# Patient Record
Sex: FEMALE | Race: WHITE | NOT HISPANIC OR LATINO | URBAN - METROPOLITAN AREA
[De-identification: names, ages, dates, MRNs, and addresses within clinical notes are randomized per-mention and may not be internally consistent; named-entity substitution may affect disease eponyms.]

---

## 2020-05-19 ENCOUNTER — IMPORTED ENCOUNTER (OUTPATIENT)
Dept: URBAN - METROPOLITAN AREA CLINIC 9 | Facility: CLINIC | Age: 70
End: 2020-05-19

## 2020-05-29 ENCOUNTER — IMPORTED ENCOUNTER (OUTPATIENT)
Dept: URBAN - METROPOLITAN AREA CLINIC 9 | Facility: CLINIC | Age: 70
End: 2020-05-29

## 2020-06-11 ENCOUNTER — IMPORTED ENCOUNTER (OUTPATIENT)
Dept: URBAN - METROPOLITAN AREA CLINIC 9 | Facility: CLINIC | Age: 70
End: 2020-06-11

## 2020-06-18 ENCOUNTER — IMPORTED ENCOUNTER (OUTPATIENT)
Dept: URBAN - METROPOLITAN AREA CLINIC 9 | Facility: CLINIC | Age: 70
End: 2020-06-18

## 2020-07-22 ENCOUNTER — IMPORTED ENCOUNTER (OUTPATIENT)
Dept: URBAN - METROPOLITAN AREA CLINIC 9 | Facility: CLINIC | Age: 70
End: 2020-07-22

## 2020-09-09 ENCOUNTER — IMPORTED ENCOUNTER (OUTPATIENT)
Dept: URBAN - METROPOLITAN AREA CLINIC 9 | Facility: CLINIC | Age: 70
End: 2020-09-09

## 2020-10-02 ENCOUNTER — IMPORTED ENCOUNTER (OUTPATIENT)
Dept: URBAN - METROPOLITAN AREA CLINIC 9 | Facility: CLINIC | Age: 70
End: 2020-10-02

## 2020-11-10 ENCOUNTER — IMPORTED ENCOUNTER (OUTPATIENT)
Dept: URBAN - METROPOLITAN AREA CLINIC 9 | Facility: CLINIC | Age: 70
End: 2020-11-10

## 2020-12-17 ENCOUNTER — IMPORTED ENCOUNTER (OUTPATIENT)
Dept: URBAN - METROPOLITAN AREA CLINIC 9 | Facility: CLINIC | Age: 70
End: 2020-12-17

## 2021-02-18 ENCOUNTER — IMPORTED ENCOUNTER (OUTPATIENT)
Dept: URBAN - METROPOLITAN AREA CLINIC 9 | Facility: CLINIC | Age: 71
End: 2021-02-18

## 2021-04-01 ENCOUNTER — IMPORTED ENCOUNTER (OUTPATIENT)
Dept: URBAN - METROPOLITAN AREA CLINIC 9 | Facility: CLINIC | Age: 71
End: 2021-04-01

## 2021-07-27 ENCOUNTER — IMPORTED ENCOUNTER (OUTPATIENT)
Dept: URBAN - METROPOLITAN AREA CLINIC 9 | Facility: CLINIC | Age: 71
End: 2021-07-27

## 2021-09-14 ENCOUNTER — IMPORTED ENCOUNTER (OUTPATIENT)
Dept: URBAN - METROPOLITAN AREA CLINIC 9 | Facility: CLINIC | Age: 71
End: 2021-09-14

## 2021-10-16 ASSESSMENT — VISUAL ACUITY
OD_SC: 20/50 + SN
OS_CC: 20/30 SN
OD_SC: 20/40 SN
OD_SC: 20/70 SN
OD_CC: 20/40 SN
OD_PH: 20/30 SN
OD_SC: 20/50 SN
OS_SC: 20/30 -2 SN
OD_CC: 20/40 SN
OD_SC: 20/200 SN
OD_SC: 20/50 SN
OS_SC: 20/200 SN
OS_SC: 20/40 SN
OD_PH: 20/30 -2 SN
OD_CC: 20/40 SN
OS_SC: 20/40 - SN
OD_SC: 20/50 SN
OS_CC: 20/30 SN
OS_SC: 20/30 SN
OD_SC: 20/200 SN
OD_SC: 20/50 SN
OD_SC: 20/30 SN
OS_CC: 20/30 SN
OD_PH: 20/30 SN
OS_SC: 20/25 SN
OD_PH: 20/30 SN
OD_SC: 20/50 SN
OS_SC: 20/40 SN
OD_SC: 20/30 SN
OS_SC: 20/30 SN
OD_CC: 20/30 SN
OD_SC: 20/40 SN
OD_CC: 20/50 SN
OD_CC: 20/70 SN
OS_CC: 20/30 SN
OD_CC: 20/70 SN
OS_CC: 20/60 SN
OS_SC: 20/200 SN
OS_SC: 20/30 SN
OS_CC: 20/30 SN
OD_CC: 20/60 SN
OS_SC: 20/30 - SN
OS_CC: 20/60 SN
OS_SC: 20/30 SN

## 2021-10-16 ASSESSMENT — TONOMETRY
OD_IOP_MMHG: 9
OD_IOP_MMHG: 17
OD_IOP_MMHG: 12
OS_IOP_MMHG: 17
OS_IOP_MMHG: 13
OD_IOP_MMHG: 14
OD_IOP_MMHG: 14
OD_IOP_MMHG: 11
OD_IOP_MMHG: 14
OD_IOP_MMHG: 11
OD_IOP_MMHG: 10
OS_IOP_MMHG: 9
OD_IOP_MMHG: 11
OD_IOP_MMHG: 10
OS_IOP_MMHG: 14
OS_IOP_MMHG: 10
OS_IOP_MMHG: 12
OS_IOP_MMHG: 11
OS_IOP_MMHG: 11
OS_IOP_MMHG: 13
OS_IOP_MMHG: 11
OD_IOP_MMHG: 12
OS_IOP_MMHG: 11

## 2021-10-16 ASSESSMENT — KERATOMETRY
OS_AXISANGLE_DEGREES: 158
OS_AXISANGLE2_DEGREES: 48
OD_AXISANGLE_DEGREES: 25
OS_K2POWER_DIOPTERS: 43.25
OD_K1POWER_DIOPTERS: 43.75
OS_AXISANGLE2_DEGREES: 68
OS_K2POWER_DIOPTERS: 43.75
OS_K1POWER_DIOPTERS: 43
OS_K1POWER_DIOPTERS: 43
OS_AXISANGLE_DEGREES: 138
OD_AXISANGLE2_DEGREES: 115
OD_K2POWER_DIOPTERS: 45.25

## 2021-12-20 ENCOUNTER — FOLLOW UP (OUTPATIENT)
Dept: URBAN - NONMETROPOLITAN AREA CLINIC 6 | Facility: CLINIC | Age: 71
End: 2021-12-20

## 2021-12-20 DIAGNOSIS — H04.123: ICD-10-CM

## 2021-12-20 PROCEDURE — 92012 INTRM OPH EXAM EST PATIENT: CPT

## 2021-12-20 ASSESSMENT — VISUAL ACUITY
OS_SC: 20/30+1
OD_SC: 20/40-1

## 2021-12-20 ASSESSMENT — TONOMETRY
OS_IOP_MMHG: 12
OD_IOP_MMHG: 13

## 2022-03-21 ENCOUNTER — ESTABLISHED PATIENT (OUTPATIENT)
Dept: URBAN - NONMETROPOLITAN AREA CLINIC 6 | Facility: CLINIC | Age: 72
End: 2022-03-21

## 2022-03-21 DIAGNOSIS — H01.02B: ICD-10-CM

## 2022-03-21 DIAGNOSIS — H04.123: ICD-10-CM

## 2022-03-21 DIAGNOSIS — Z96.1: ICD-10-CM

## 2022-03-21 DIAGNOSIS — H01.02A: ICD-10-CM

## 2022-03-21 PROCEDURE — 92012 INTRM OPH EXAM EST PATIENT: CPT

## 2022-03-21 ASSESSMENT — KERATOMETRY
OS_AXISANGLE2_DEGREES: 34
OD_AXISANGLE2_DEGREES: 123
OD_AXISANGLE_DEGREES: 033
OS_K1POWER_DIOPTERS: 42.50
OD_K1POWER_DIOPTERS: 43.75
OS_K2POWER_DIOPTERS: 43.25
OS_AXISANGLE_DEGREES: 124
OD_K2POWER_DIOPTERS: 44.25

## 2022-03-21 ASSESSMENT — VISUAL ACUITY
OD_SC: 20/40+1
OU_SC: 20/25-1
OS_SC: 20/25-2

## 2022-04-21 ENCOUNTER — ESTABLISHED PATIENT (OUTPATIENT)
Dept: URBAN - NONMETROPOLITAN AREA CLINIC 6 | Facility: CLINIC | Age: 72
End: 2022-04-21

## 2022-04-21 DIAGNOSIS — H01.02B: ICD-10-CM

## 2022-04-21 DIAGNOSIS — H26.493: ICD-10-CM

## 2022-04-21 DIAGNOSIS — H02.834: ICD-10-CM

## 2022-04-21 DIAGNOSIS — H01.02A: ICD-10-CM

## 2022-04-21 DIAGNOSIS — H04.123: ICD-10-CM

## 2022-04-21 DIAGNOSIS — H02.831: ICD-10-CM

## 2022-04-21 PROCEDURE — 92015 DETERMINE REFRACTIVE STATE: CPT

## 2022-04-21 PROCEDURE — 92014 COMPRE OPH EXAM EST PT 1/>: CPT

## 2022-04-21 PROCEDURE — 66821 AFTER CATARACT LASER SURGERY: CPT

## 2022-04-21 ASSESSMENT — VISUAL ACUITY
OD_GLARE: 20/70
OD_SC: 20/40-1
OS_GLARE: 20/70
OS_SC: 20/30-1

## 2022-04-21 ASSESSMENT — TONOMETRY
OS_IOP_MMHG: 13
OD_IOP_MMHG: 15

## 2022-05-12 ENCOUNTER — CLINIC PROCEDURE ONLY (OUTPATIENT)
Dept: URBAN - NONMETROPOLITAN AREA CLINIC 6 | Facility: CLINIC | Age: 72
End: 2022-05-12

## 2022-05-12 DIAGNOSIS — H26.492: ICD-10-CM

## 2022-05-12 PROBLEM — Z98.890: Noted: 2022-05-12

## 2022-05-12 PROCEDURE — 66821 AFTER CATARACT LASER SURGERY: CPT

## 2022-05-12 ASSESSMENT — VISUAL ACUITY: OD_SC: 20/30-1

## 2022-05-12 ASSESSMENT — TONOMETRY
OS_IOP_MMHG: 14
OD_IOP_MMHG: 14

## 2022-05-18 ENCOUNTER — POST-OP (OUTPATIENT)
Dept: URBAN - NONMETROPOLITAN AREA CLINIC 6 | Facility: CLINIC | Age: 72
End: 2022-05-18

## 2022-05-18 DIAGNOSIS — Z98.890: ICD-10-CM

## 2022-05-18 PROCEDURE — 99024 POSTOP FOLLOW-UP VISIT: CPT

## 2022-05-18 ASSESSMENT — TONOMETRY
OS_IOP_MMHG: 12
OD_IOP_MMHG: 12

## 2022-05-18 ASSESSMENT — KERATOMETRY
OD_K1POWER_DIOPTERS: 44.00
OD_K2POWER_DIOPTERS: 44.25
OD_AXISANGLE2_DEGREES: 122
OS_AXISANGLE_DEGREES: 83
OD_AXISANGLE_DEGREES: 32
OS_K2POWER_DIOPTERS: 43.50
OS_AXISANGLE2_DEGREES: 173
OS_K1POWER_DIOPTERS: 42.50

## 2022-05-18 ASSESSMENT — VISUAL ACUITY
OU_SC: 20/30-1
OS_SC: 20/30-1
OD_SC: 20/30-2

## 2022-09-14 ENCOUNTER — FOLLOW UP (OUTPATIENT)
Dept: URBAN - NONMETROPOLITAN AREA CLINIC 6 | Facility: CLINIC | Age: 72
End: 2022-09-14

## 2022-09-14 DIAGNOSIS — H04.123: ICD-10-CM

## 2022-09-14 DIAGNOSIS — Z96.1: ICD-10-CM

## 2022-09-14 PROCEDURE — 99213 OFFICE O/P EST LOW 20 MIN: CPT

## 2022-09-14 ASSESSMENT — KERATOMETRY
OS_AXISANGLE2_DEGREES: 173
OD_K1POWER_DIOPTERS: 44.00
OS_K2POWER_DIOPTERS: 43.50
OD_AXISANGLE_DEGREES: 32
OS_AXISANGLE_DEGREES: 83
OD_K2POWER_DIOPTERS: 44.25
OS_K1POWER_DIOPTERS: 42.50
OD_AXISANGLE2_DEGREES: 122

## 2022-09-14 ASSESSMENT — VISUAL ACUITY
OD_SC: 20/40
OS_SC: 20/30
OU_SC: 20/30

## 2023-07-05 ENCOUNTER — COMPREHENSIVE EXAM (OUTPATIENT)
Dept: URBAN - NONMETROPOLITAN AREA CLINIC 6 | Facility: CLINIC | Age: 73
End: 2023-07-05

## 2023-07-05 DIAGNOSIS — H43.813: ICD-10-CM

## 2023-07-05 DIAGNOSIS — H01.02A: ICD-10-CM

## 2023-07-05 DIAGNOSIS — H02.884: ICD-10-CM

## 2023-07-05 DIAGNOSIS — H04.123: ICD-10-CM

## 2023-07-05 DIAGNOSIS — H01.02B: ICD-10-CM

## 2023-07-05 DIAGNOSIS — H02.881: ICD-10-CM

## 2023-07-05 PROCEDURE — 92014 COMPRE OPH EXAM EST PT 1/>: CPT

## 2023-07-05 ASSESSMENT — TONOMETRY
OS_IOP_MMHG: 15
OD_IOP_MMHG: 13

## 2023-07-05 ASSESSMENT — KERATOMETRY
OS_AXISANGLE2_DEGREES: 52
OS_K2POWER_DIOPTERS: 43.50
OD_AXISANGLE_DEGREES: 18
OS_AXISANGLE_DEGREES: 142
OS_K1POWER_DIOPTERS: 42.75
OD_AXISANGLE2_DEGREES: 108
OD_K2POWER_DIOPTERS: 46.00
OD_K1POWER_DIOPTERS: 44.75

## 2023-07-05 ASSESSMENT — VISUAL ACUITY
OS_SC: 20/25-2
OD_SC: 20/30
OS_GLARE: 20/30
OU_SC: 20/30
OD_GLARE: 20/50

## 2024-02-19 ENCOUNTER — FOLLOW UP (OUTPATIENT)
Dept: URBAN - NONMETROPOLITAN AREA CLINIC 6 | Facility: CLINIC | Age: 74
End: 2024-02-19

## 2024-02-19 DIAGNOSIS — H01.02A: ICD-10-CM

## 2024-02-19 DIAGNOSIS — H02.881: ICD-10-CM

## 2024-02-19 DIAGNOSIS — H02.884: ICD-10-CM

## 2024-02-19 DIAGNOSIS — H01.02B: ICD-10-CM

## 2024-02-19 DIAGNOSIS — H04.123: ICD-10-CM

## 2024-02-19 PROCEDURE — 99213 OFFICE O/P EST LOW 20 MIN: CPT

## 2024-02-19 ASSESSMENT — KERATOMETRY
OS_K2POWER_DIOPTERS: 43.50
OS_AXISANGLE2_DEGREES: 52
OD_AXISANGLE2_DEGREES: 108
OD_K2POWER_DIOPTERS: 46.00
OS_K1POWER_DIOPTERS: 42.75
OD_AXISANGLE_DEGREES: 18
OD_K1POWER_DIOPTERS: 44.75
OS_AXISANGLE_DEGREES: 142

## 2024-02-19 ASSESSMENT — TONOMETRY
OD_IOP_MMHG: 10
OS_IOP_MMHG: 10

## 2024-02-19 ASSESSMENT — VISUAL ACUITY
OD_SC: 20/40-1
OS_SC: 20/25-1

## 2024-08-15 ENCOUNTER — COMPREHENSIVE EXAM (OUTPATIENT)
Dept: URBAN - METROPOLITAN AREA CLINIC 18 | Facility: CLINIC | Age: 74
End: 2024-08-15

## 2024-08-15 DIAGNOSIS — H01.02A: ICD-10-CM

## 2024-08-15 DIAGNOSIS — H43.813: ICD-10-CM

## 2024-08-15 DIAGNOSIS — H04.123: ICD-10-CM

## 2024-08-15 DIAGNOSIS — H02.881: ICD-10-CM

## 2024-08-15 DIAGNOSIS — H01.02B: ICD-10-CM

## 2024-08-15 DIAGNOSIS — H02.884: ICD-10-CM

## 2024-08-15 PROCEDURE — 92014 COMPRE OPH EXAM EST PT 1/>: CPT

## 2024-08-15 ASSESSMENT — KERATOMETRY
OD_AXISANGLE2_DEGREES: 108
OS_K1POWER_DIOPTERS: 42.75
OD_AXISANGLE_DEGREES: 18
OS_K2POWER_DIOPTERS: 43.50
OS_AXISANGLE2_DEGREES: 52
OS_AXISANGLE_DEGREES: 142
OD_K1POWER_DIOPTERS: 44.75
OD_K2POWER_DIOPTERS: 46.00

## 2024-08-15 ASSESSMENT — VISUAL ACUITY
OS_SC: 20/25
OD_SC: 20/30

## 2024-08-15 ASSESSMENT — TONOMETRY
OD_IOP_MMHG: 13
OS_IOP_MMHG: 12

## 2025-01-10 ENCOUNTER — FOLLOW UP (OUTPATIENT)
Age: 75
End: 2025-01-10

## 2025-01-10 DIAGNOSIS — H04.123: ICD-10-CM

## 2025-01-10 DIAGNOSIS — H16.143: ICD-10-CM

## 2025-01-10 PROCEDURE — 92285 EXTERNAL OCULAR PHOTOGRAPHY: CPT

## 2025-01-10 PROCEDURE — 68761 CLOSE TEAR DUCT OPENING: CPT | Mod: E2,50,E4

## 2025-01-10 PROCEDURE — 99214 OFFICE O/P EST MOD 30 MIN: CPT | Mod: 25

## 2025-02-18 ENCOUNTER — FOLLOW UP (OUTPATIENT)
Age: 75
End: 2025-02-18

## 2025-02-18 DIAGNOSIS — H04.123: ICD-10-CM

## 2025-02-18 PROCEDURE — 99214 OFFICE O/P EST MOD 30 MIN: CPT

## 2025-02-28 ENCOUNTER — FOLLOW UP WITH CLINIC PROCEDURE (OUTPATIENT)
Age: 75
End: 2025-02-28

## 2025-02-28 DIAGNOSIS — H01.02A: ICD-10-CM

## 2025-02-28 DIAGNOSIS — H01.02B: ICD-10-CM

## 2025-02-28 DIAGNOSIS — H02.881: ICD-10-CM

## 2025-02-28 DIAGNOSIS — H16.143: ICD-10-CM

## 2025-02-28 DIAGNOSIS — H02.884: ICD-10-CM

## 2025-02-28 PROCEDURE — 67999I ILUX- MGD

## 2025-02-28 PROCEDURE — 99214 OFFICE O/P EST MOD 30 MIN: CPT | Mod: NC

## 2025-07-29 ENCOUNTER — FOLLOW UP (OUTPATIENT)
Age: 75
End: 2025-07-29

## 2025-07-29 DIAGNOSIS — H02.881: ICD-10-CM

## 2025-07-29 DIAGNOSIS — H02.884: ICD-10-CM

## 2025-07-29 DIAGNOSIS — H16.143: ICD-10-CM

## 2025-07-29 DIAGNOSIS — H01.02A: ICD-10-CM

## 2025-07-29 DIAGNOSIS — H01.02B: ICD-10-CM

## 2025-07-29 PROCEDURE — 99214 OFFICE O/P EST MOD 30 MIN: CPT

## 2025-07-29 PROCEDURE — 68761 CLOSE TEAR DUCT OPENING: CPT | Mod: E2

## 2025-07-29 PROCEDURE — 68761 CLOSE TEAR DUCT OPENING: CPT | Mod: E4,LT
